# Patient Record
Sex: MALE | Race: BLACK OR AFRICAN AMERICAN | NOT HISPANIC OR LATINO | URBAN - METROPOLITAN AREA
[De-identification: names, ages, dates, MRNs, and addresses within clinical notes are randomized per-mention and may not be internally consistent; named-entity substitution may affect disease eponyms.]

---

## 2022-12-06 ENCOUNTER — OFFICE (OUTPATIENT)
Dept: URBAN - METROPOLITAN AREA CLINIC 79 | Facility: CLINIC | Age: 62
End: 2022-12-06

## 2022-12-06 VITALS
HEART RATE: 52 BPM | TEMPERATURE: 98.6 F | DIASTOLIC BLOOD PRESSURE: 79 MMHG | SYSTOLIC BLOOD PRESSURE: 145 MMHG | HEIGHT: 72 IN | WEIGHT: 204 LBS

## 2022-12-06 DIAGNOSIS — K62.5 HEMORRHAGE OF ANUS AND RECTUM: ICD-10-CM

## 2022-12-06 DIAGNOSIS — Z86.010 PERSONAL HISTORY OF COLONIC POLYPS: ICD-10-CM

## 2022-12-06 DIAGNOSIS — I10 ESSENTIAL (PRIMARY) HYPERTENSION: ICD-10-CM

## 2022-12-06 PROCEDURE — 99204 OFFICE O/P NEW MOD 45 MIN: CPT | Performed by: PHYSICIAN ASSISTANT

## 2022-12-06 NOTE — SERVICENOTES
I have reviewed the history, physical exam, assessment and management plans.  I concur with or have edited all elements of her note.
no

## 2022-12-06 NOTE — SERVICEHPINOTES
ANDRYI HARTMAN   is a   62   year old  male who is being seen in consultation at the request of   FALLON ARRINGTON   for BRBPR onset x 1 year ago. He reports intermittent events of this painless, BRBPR seen on wipe and in toilet bowl water: Events every few weeks. He has BMs 1-2x/day, BSS type 3-4: No straining needed to pass stool. Last colonoscopy removed "2 polyps" in 2013 [facility] (Dr Alejandro Rivas MD/Tima Tobin MD). No fm h/o CRC or colonic polyps. He is followed by Dr Edward Ortez for his HTN, which is well controlled given home BP 130s-140/80s. No known cardiac or pulmonary disease. Denies chest pain, dyspnea, n/v, abdominal pain, change in BMs, diarrhea, melena, weight loss. br
br

## 2023-02-03 ENCOUNTER — OFFICE (OUTPATIENT)
Dept: URBAN - METROPOLITAN AREA PATHOLOGY 18 | Facility: PATHOLOGY | Age: 63
End: 2023-02-03
Payer: COMMERCIAL

## 2023-02-03 ENCOUNTER — OFFICE (OUTPATIENT)
Dept: URBAN - METROPOLITAN AREA CLINIC 98 | Facility: CLINIC | Age: 63
End: 2023-02-03
Payer: COMMERCIAL

## 2023-02-03 VITALS
HEART RATE: 49 BPM | SYSTOLIC BLOOD PRESSURE: 99 MMHG | HEART RATE: 54 BPM | DIASTOLIC BLOOD PRESSURE: 71 MMHG | RESPIRATION RATE: 16 BRPM | SYSTOLIC BLOOD PRESSURE: 84 MMHG | SYSTOLIC BLOOD PRESSURE: 116 MMHG | HEIGHT: 72 IN | RESPIRATION RATE: 14 BRPM | SYSTOLIC BLOOD PRESSURE: 111 MMHG | DIASTOLIC BLOOD PRESSURE: 53 MMHG | OXYGEN SATURATION: 95 % | HEART RATE: 68 BPM | HEART RATE: 51 BPM | HEART RATE: 52 BPM | WEIGHT: 204 LBS | HEART RATE: 61 BPM | DIASTOLIC BLOOD PRESSURE: 81 MMHG | DIASTOLIC BLOOD PRESSURE: 75 MMHG | OXYGEN SATURATION: 100 % | SYSTOLIC BLOOD PRESSURE: 85 MMHG | OXYGEN SATURATION: 99 % | OXYGEN SATURATION: 97 % | DIASTOLIC BLOOD PRESSURE: 87 MMHG | DIASTOLIC BLOOD PRESSURE: 57 MMHG | OXYGEN SATURATION: 98 % | SYSTOLIC BLOOD PRESSURE: 123 MMHG | SYSTOLIC BLOOD PRESSURE: 122 MMHG | RESPIRATION RATE: 19 BRPM | TEMPERATURE: 97.3 F | DIASTOLIC BLOOD PRESSURE: 46 MMHG | RESPIRATION RATE: 17 BRPM

## 2023-02-03 DIAGNOSIS — K63.5 POLYP OF COLON: ICD-10-CM

## 2023-02-03 DIAGNOSIS — Z12.11 ENCOUNTER FOR SCREENING FOR MALIGNANT NEOPLASM OF COLON: ICD-10-CM

## 2023-02-03 PROCEDURE — 88305 TISSUE EXAM BY PATHOLOGIST: CPT | Performed by: PATHOLOGY

## 2023-02-03 NOTE — SERVICEHPINOTES
Pt presents for screening colonoscopy. Last exam 2013, may have had polyps removed. No gastrointestinal complaints at present. No family history of colon cancer.

## 2023-04-10 ENCOUNTER — OFFICE (OUTPATIENT)
Dept: URBAN - METROPOLITAN AREA CLINIC 102 | Facility: CLINIC | Age: 63
End: 2023-04-10

## 2023-04-10 VITALS
HEIGHT: 72 IN | DIASTOLIC BLOOD PRESSURE: 75 MMHG | WEIGHT: 210 LBS | SYSTOLIC BLOOD PRESSURE: 122 MMHG | HEART RATE: 47 BPM | TEMPERATURE: 98 F

## 2023-04-10 DIAGNOSIS — K62.5 HEMORRHAGE OF ANUS AND RECTUM: ICD-10-CM

## 2023-04-10 DIAGNOSIS — K64.0 FIRST DEGREE HEMORRHOIDS: ICD-10-CM

## 2023-04-10 PROCEDURE — 46221 LIGATION OF HEMORRHOID(S): CPT | Performed by: INTERNAL MEDICINE

## 2023-04-14 ENCOUNTER — OFFICE (OUTPATIENT)
Dept: URBAN - METROPOLITAN AREA CLINIC 34 | Facility: CLINIC | Age: 63
End: 2023-04-14

## 2023-04-14 VITALS
HEART RATE: 45 BPM | HEIGHT: 72 IN | WEIGHT: 212 LBS | DIASTOLIC BLOOD PRESSURE: 76 MMHG | TEMPERATURE: 98.2 F | SYSTOLIC BLOOD PRESSURE: 143 MMHG

## 2023-04-14 DIAGNOSIS — K62.89 OTHER SPECIFIED DISEASES OF ANUS AND RECTUM: ICD-10-CM

## 2023-04-14 DIAGNOSIS — Z86.010 PERSONAL HISTORY OF COLONIC POLYPS: ICD-10-CM

## 2023-04-14 DIAGNOSIS — K64.1 SECOND DEGREE HEMORRHOIDS: ICD-10-CM

## 2023-04-14 PROCEDURE — 99214 OFFICE O/P EST MOD 30 MIN: CPT | Performed by: INTERNAL MEDICINE

## 2023-04-14 RX ORDER — OXYCODONE HYDROCHLORIDE AND ACETAMINOPHEN 10; 325 MG/1; MG/1
TABLET ORAL
Qty: 5 | Refills: 0 | Status: COMPLETED
Start: 2023-04-14 | End: 2023-07-03

## 2023-06-08 ENCOUNTER — OFFICE (OUTPATIENT)
Dept: URBAN - METROPOLITAN AREA CLINIC 102 | Facility: CLINIC | Age: 63
End: 2023-06-08

## 2023-06-08 VITALS
DIASTOLIC BLOOD PRESSURE: 73 MMHG | TEMPERATURE: 98.3 F | HEART RATE: 46 BPM | WEIGHT: 210 LBS | SYSTOLIC BLOOD PRESSURE: 134 MMHG | HEIGHT: 72 IN

## 2023-06-08 DIAGNOSIS — K64.1 SECOND DEGREE HEMORRHOIDS: ICD-10-CM

## 2023-06-08 PROCEDURE — 99214 OFFICE O/P EST MOD 30 MIN: CPT | Performed by: INTERNAL MEDICINE

## 2023-07-03 ENCOUNTER — OFFICE (OUTPATIENT)
Dept: URBAN - METROPOLITAN AREA CLINIC 102 | Facility: CLINIC | Age: 63
End: 2023-07-03

## 2023-07-03 VITALS
DIASTOLIC BLOOD PRESSURE: 76 MMHG | TEMPERATURE: 98.3 F | SYSTOLIC BLOOD PRESSURE: 135 MMHG | WEIGHT: 207 LBS | HEART RATE: 46 BPM | HEIGHT: 72 IN

## 2023-07-03 DIAGNOSIS — K59.00 CONSTIPATION, UNSPECIFIED: ICD-10-CM

## 2023-07-03 DIAGNOSIS — K64.9 UNSPECIFIED HEMORRHOIDS: ICD-10-CM

## 2023-07-03 DIAGNOSIS — K62.89 OTHER SPECIFIED DISEASES OF ANUS AND RECTUM: ICD-10-CM

## 2023-07-03 DIAGNOSIS — K60.2 ANAL FISSURE, UNSPECIFIED: ICD-10-CM

## 2023-07-03 PROCEDURE — 99214 OFFICE O/P EST MOD 30 MIN: CPT | Performed by: PHYSICIAN ASSISTANT

## 2023-08-22 ENCOUNTER — OFFICE (OUTPATIENT)
Dept: URBAN - METROPOLITAN AREA CLINIC 79 | Facility: CLINIC | Age: 63
End: 2023-08-22

## 2023-08-22 VITALS
WEIGHT: 210 LBS | TEMPERATURE: 98.4 F | SYSTOLIC BLOOD PRESSURE: 145 MMHG | HEART RATE: 48 BPM | HEIGHT: 72 IN | DIASTOLIC BLOOD PRESSURE: 85 MMHG

## 2023-08-22 DIAGNOSIS — K64.1 SECOND DEGREE HEMORRHOIDS: ICD-10-CM

## 2023-08-22 DIAGNOSIS — K60.2 ANAL FISSURE, UNSPECIFIED: ICD-10-CM

## 2023-08-22 PROCEDURE — 99214 OFFICE O/P EST MOD 30 MIN: CPT | Performed by: INTERNAL MEDICINE

## 2023-09-22 ENCOUNTER — TELEHEALTH PROVIDED OTHER THAN IN PATIENT'S HOME (OUTPATIENT)
Dept: URBAN - METROPOLITAN AREA TELEHEALTH 3 | Facility: TELEHEALTH | Age: 63
End: 2023-09-22

## 2023-09-22 VITALS — WEIGHT: 210 LBS | HEIGHT: 72 IN

## 2023-09-22 DIAGNOSIS — K64.1 SECOND DEGREE HEMORRHOIDS: ICD-10-CM

## 2023-09-22 DIAGNOSIS — K62.5 HEMORRHAGE OF ANUS AND RECTUM: ICD-10-CM

## 2023-09-22 DIAGNOSIS — K60.2 ANAL FISSURE, UNSPECIFIED: ICD-10-CM

## 2023-09-22 PROCEDURE — 99214 OFFICE O/P EST MOD 30 MIN: CPT | Mod: 95 | Performed by: INTERNAL MEDICINE

## 2023-09-22 NOTE — SERVICEHPINOTES
PATIENT VERIFIED BY DATE OF BIRTH AND NAME. Patient has been consented for this telecommunication visit.
mónica Wolfe is here to discuss hemorrhoids. He had a colonoscopy 02/23 with Dr. Collins which showed one non-adenomatous polyp--given a 10 yr recall--internal hemorrhoids were shown.He attempted hemorrhoidal banding but this was painful so Dr. Moses [facility] embolization. He had the procedure 6/28/23. He feels tenesmus while wiping, but slowly getting better. No rectal bleeding.  On last visit found to have an anal fissure - since then, he's been applying BID NTG / Lidocaine ointment + oral Metamucil, much better today.
mónica Garcia 10 point review of systems have been reviewed as per HPI and otherwise negative. span id="{[id number]}" class="narrative freetextSelected" type="freetext" canedit="true" suppressed="false" nid="[id number]" gid="{[id number]}" bound="false" visited="true" br /spanspan id="{[id number]}" class="narrative placeholderNormal" type="placeholder" canedit="true" suppressed="false" nid="[id number]" gid="{[id number]}" propertyname="rosWording" displayname="ROS Wording" tooltip="" handler="" handlerdata="" datatype="text" mandatory="false" requires="" allowmultipleentries="" describes="" tagname="" prototype="" dontshowempty="false" empty="true" onmouseover="__NarrativeOnMouseOver('{[id number]}')" onmouseout="__NarrativeOnMouseOut('{[id number]}')" onmousedown="__NarrativePlaceholderClicked('{[id number]}')" visited="true"[ROS Wording]/spanspan id="{[id number]}" class="narrative freetextNormal" type="freetext" canedit="true" suppressed="false" nid="[id number]" gid="{[id number]}" bound="false" /span

## 2023-09-22 NOTE — SERVICENOTES
Patient's visit was conducted through [employer] video telecommunication. Patient consented before the start of visit as to understanding of privacy concerns, possible technological failure, and their responsibility of carrying out instructions of plan.

## 2023-11-20 ENCOUNTER — TELEHEALTH PROVIDED OTHER THAN IN PATIENT'S HOME (OUTPATIENT)
Dept: URBAN - METROPOLITAN AREA TELEHEALTH 12 | Facility: TELEHEALTH | Age: 63
End: 2023-11-20

## 2023-11-20 VITALS — HEIGHT: 72 IN | WEIGHT: 210 LBS

## 2023-11-20 DIAGNOSIS — K59.4 ANAL SPASM: ICD-10-CM

## 2023-11-20 DIAGNOSIS — K64.1 SECOND DEGREE HEMORRHOIDS: ICD-10-CM

## 2023-11-20 DIAGNOSIS — K60.2 ANAL FISSURE, UNSPECIFIED: ICD-10-CM

## 2023-11-20 PROCEDURE — 99214 OFFICE O/P EST MOD 30 MIN: CPT | Mod: 95 | Performed by: INTERNAL MEDICINE

## 2023-11-20 NOTE — SERVICEHPINOTES
PATIENT VERIFIED BY DATE OF BIRTH AND NAME. Patient has been consented for this telecommunication visit. 
mónica Wolfe is here to discuss hemorrhoids. He had a colonoscopy 02/23 with Dr. Collins which showed one non-adenomatous polyp--given a 10 yr recall--internal hemorrhoids were shown.He attempted hemorrhoidal banding but this was painful, so he was treated with supportive care and Lidocaine ointment.  He was eventually sent [facility] to [facility] for a rectal artery embolization. He had the procedure 6/28/23.He feels tenesmus while wiping, but slowly getting better. No rectal bleeding. On last visit found to have an anal fissure - since then, he's been applying BID NTG / Lidocaine ointment + oral Metamucil, much better today.All 10 point review of systems have been reviewed as per HPI and otherwise negative. mónica

## 2023-11-20 NOTE — SERVICENOTES
Patient's visit was conducted through "[employer] video telecommunication. Patient consented before the start of visit as to understanding of privacy concerns, possible technological failure, and their responsibility of carrying out instructions of plan.